# Patient Record
Sex: MALE | Race: WHITE | Employment: OTHER | URBAN - METROPOLITAN AREA
[De-identification: names, ages, dates, MRNs, and addresses within clinical notes are randomized per-mention and may not be internally consistent; named-entity substitution may affect disease eponyms.]

---

## 2018-07-19 ENCOUNTER — HOSPITAL ENCOUNTER (OUTPATIENT)
Age: 78
Discharge: HOME | End: 2018-07-19
Attending: EMERGENCY MEDICINE
Payer: MEDICARE

## 2018-07-19 VITALS
RESPIRATION RATE: 18 BRPM | SYSTOLIC BLOOD PRESSURE: 154 MMHG | OXYGEN SATURATION: 98 % | DIASTOLIC BLOOD PRESSURE: 93 MMHG | TEMPERATURE: 98 F | HEART RATE: 69 BPM

## 2018-07-19 DIAGNOSIS — J01.90 ACUTE NON-RECURRENT SINUSITIS, UNSPECIFIED LOCATION: Primary | ICD-10-CM

## 2018-07-19 PROCEDURE — 99204 OFFICE O/P NEW MOD 45 MIN: CPT

## 2018-07-19 PROCEDURE — 99203 OFFICE O/P NEW LOW 30 MIN: CPT

## 2018-07-19 RX ORDER — AZITHROMYCIN 250 MG/1
TABLET, FILM COATED ORAL
Qty: 1 PACKAGE | Refills: 0 | Status: SHIPPED | OUTPATIENT
Start: 2018-07-19 | End: 2019-02-06

## 2018-07-19 RX ORDER — FEXOFENADINE HYDROCHLORIDE 60 MG/1
60 TABLET, FILM COATED ORAL 2 TIMES DAILY
Qty: 20 TABLET | Refills: 0 | Status: SHIPPED | OUTPATIENT
Start: 2018-07-19 | End: 2018-08-08

## 2018-07-19 RX ORDER — FLUTICASONE PROPIONATE 50 MCG
2 SPRAY, SUSPENSION (ML) NASAL DAILY
Qty: 16 G | Refills: 0 | Status: SHIPPED | OUTPATIENT
Start: 2018-07-19 | End: 2018-08-18

## 2018-07-19 RX ORDER — METOPROLOL SUCCINATE 100 MG/1
100 TABLET, EXTENDED RELEASE ORAL DAILY
COMMUNITY

## 2018-07-19 NOTE — ED INITIAL ASSESSMENT (HPI)
Pt reports head and sinus congestion for 5-6 days. Reports mild, dry cough. Denies fevers. Denies pain. Reports nasal congestion.

## 2018-07-19 NOTE — ED PROVIDER NOTES
Patient Seen in: [address] [facility] [address]    History   Patient presents with:  Cough/URI    Stated Complaint: head cold    HPI    5year-old male patient presents complaining of cough and congestion with facial pressure and feeling ho plan with patient and patient's wife. Went over prescription medications.       Disposition and Plan     Clinical Impression:  Acute non-recurrent sinusitis, unspecified location  (primary encounter diagnosis)    Disposition:  Discharge  7/19/2018 10:13 am

## 2018-10-26 ENCOUNTER — HOSPITAL ENCOUNTER (OUTPATIENT)
Age: 78
Discharge: HOME | End: 2018-10-26
Attending: FAMILY MEDICINE
Payer: MEDICARE

## 2018-10-26 VITALS
TEMPERATURE: 98 F | RESPIRATION RATE: 18 BRPM | DIASTOLIC BLOOD PRESSURE: 89 MMHG | HEART RATE: 76 BPM | SYSTOLIC BLOOD PRESSURE: 147 MMHG | OXYGEN SATURATION: 99 %

## 2018-10-26 DIAGNOSIS — J06.9 VIRAL UPPER RESPIRATORY TRACT INFECTION: ICD-10-CM

## 2018-10-26 DIAGNOSIS — H60.331 ACUTE SWIMMER'S EAR OF RIGHT SIDE: Primary | ICD-10-CM

## 2018-10-26 PROCEDURE — 99214 OFFICE O/P EST MOD 30 MIN: CPT

## 2018-10-26 PROCEDURE — 99213 OFFICE O/P EST LOW 20 MIN: CPT

## 2018-10-26 RX ORDER — AMOXICILLIN 875 MG/1
875 TABLET, COATED ORAL 2 TIMES DAILY
Qty: 20 TABLET | Refills: 0 | Status: SHIPPED | OUTPATIENT
Start: 2018-10-26 | End: 2018-11-05

## 2018-10-26 NOTE — ED NOTES
Pt discharged home in stable condition. Meds and avs reviewed. Follow up as indicated. Pt verbalized understanding and agreed.

## 2018-10-26 NOTE — ED PROVIDER NOTES
Pt seen in [address] [place] [address]      Stated Complaint: Patient presents with:  Cough/URI      --------------   RN assessment:     Cold sx's for a few weeks, ears, R>L plugged  --------------    CC: cough, congestion, dim hearing stridor  Cardiovascular: negative for claudication and palpitations  Gastrointestinal: negative for  jaundice and odynophagia  Genitourinary:negative for decreased stream and nocturia  Behavioral/Psych: negative for aggressive behavior and hallucinations throughout     ------------------------------------------------  ED Course:  Labs/Tests reviewed:   Labs Reviewed - No data to display          ------------------------------------------------  MDM:  Differential diagnosis(es) d/w: BLANCA PERRIN; karlie, ?  Sinusitis

## 2018-10-26 NOTE — ED INITIAL ASSESSMENT (HPI)
Pt here with complaints of head congestion that has been going on for a few weeks and states a couple days ago his ears felt  plugged more on the right ear, pt denies any fevers at this time

## 2019-02-06 ENCOUNTER — HOSPITAL ENCOUNTER (OUTPATIENT)
Age: 79
Discharge: HOME | End: 2019-02-06
Attending: FAMILY MEDICINE
Payer: MEDICARE

## 2019-02-06 VITALS
TEMPERATURE: 97 F | WEIGHT: 210 LBS | HEIGHT: 71 IN | BODY MASS INDEX: 29.4 KG/M2 | RESPIRATION RATE: 22 BRPM | HEART RATE: 69 BPM | OXYGEN SATURATION: 98 % | SYSTOLIC BLOOD PRESSURE: 140 MMHG | DIASTOLIC BLOOD PRESSURE: 101 MMHG

## 2019-02-06 DIAGNOSIS — H61.22 IMPACTED CERUMEN OF LEFT EAR: Primary | ICD-10-CM

## 2019-02-06 DIAGNOSIS — J06.9 VIRAL UPPER RESPIRATORY TRACT INFECTION: ICD-10-CM

## 2019-02-06 DIAGNOSIS — H60.502 ACUTE OTITIS EXTERNA OF LEFT EAR, UNSPECIFIED TYPE: ICD-10-CM

## 2019-02-06 PROCEDURE — 69200 CLEAR OUTER EAR CANAL: CPT

## 2019-02-06 PROCEDURE — 99214 OFFICE O/P EST MOD 30 MIN: CPT

## 2019-02-06 PROCEDURE — 99213 OFFICE O/P EST LOW 20 MIN: CPT

## 2019-02-06 PROCEDURE — 69209 REMOVE IMPACTED EAR WAX UNI: CPT

## 2019-02-06 NOTE — ED NOTES
Pt discharged home stable in good condition. Denied dizziness. Reviewed electronically sent meds, pain and avs. Follow up as indicated. Pt verbalized understanding and agreed.

## 2019-02-06 NOTE — ED INITIAL ASSESSMENT (HPI)
Pt in [address] [place] [address] with wife c/o diminished hearing in left ear noted yesterday. He report having a head cold, he reports nasal congestion. Denied pain in ear.

## 2019-02-06 NOTE — ED PROVIDER NOTES
Patient Seen in: [address] [facility]    History   Patient presents with:  Ear Problem Pain (neurosensory)    Stated Complaint: lt ear pain    HPI    65-year-old male with past medical history of hypertension presents with 3 days sounds normal.   Abdominal: Soft. Bowel sounds are normal.   Musculoskeletal: Normal range of motion. Neurological: He is alert. Skin: Skin is warm. Capillary refill takes less than 2 seconds. Psychiatric: He has a normal mood and affect.  His behavio

## 2019-02-08 ENCOUNTER — OFFICE VISIT (OUTPATIENT)
Dept: OTOLARYNGOLOGY | Facility: CLINIC | Age: 79
End: 2019-02-08
Payer: MEDICARE

## 2019-02-08 VITALS
DIASTOLIC BLOOD PRESSURE: 80 MMHG | BODY MASS INDEX: 29.4 KG/M2 | TEMPERATURE: 97 F | SYSTOLIC BLOOD PRESSURE: 160 MMHG | HEIGHT: 71 IN | WEIGHT: 210 LBS

## 2019-02-08 DIAGNOSIS — H61.23 BILATERAL IMPACTED CERUMEN: Primary | ICD-10-CM

## 2019-02-08 PROCEDURE — 92504 EAR MICROSCOPY EXAMINATION: CPT | Performed by: OTOLARYNGOLOGY

## 2019-02-08 PROCEDURE — 99203 OFFICE O/P NEW LOW 30 MIN: CPT | Performed by: OTOLARYNGOLOGY

## 2019-02-08 PROCEDURE — G0463 HOSPITAL OUTPT CLINIC VISIT: HCPCS | Performed by: OTOLARYNGOLOGY

## 2019-02-08 NOTE — PROGRESS NOTES
Shree Carson is a 77year old male. Patient presents with:  Ear Wax: both ears    HPI:   He woke up a few days ago suddenly unable to hear from his left ear. There was no pain.   He went to the immediate care and was noted to have a large amount of wa Normal Inspection - Normal. Palpation - Normal. Parotid gland - Normal. Thyroid gland - Normal.   Psychiatric Normal Orientation - Oriented to time, place, person & situation. Appropriate mood and affect. Lymph Detail Normal Submental. Submandibular.  Ant

## 2019-09-17 ENCOUNTER — OFFICE VISIT (OUTPATIENT)
Dept: OTOLARYNGOLOGY | Facility: CLINIC | Age: 79
End: 2019-09-17
Payer: MEDICARE

## 2019-09-17 VITALS
DIASTOLIC BLOOD PRESSURE: 100 MMHG | BODY MASS INDEX: 28 KG/M2 | SYSTOLIC BLOOD PRESSURE: 158 MMHG | WEIGHT: 200 LBS | HEIGHT: 71 IN | TEMPERATURE: 98 F

## 2019-09-17 DIAGNOSIS — H60.391 OTHER INFECTIVE ACUTE OTITIS EXTERNA OF RIGHT EAR: Primary | ICD-10-CM

## 2019-09-17 PROCEDURE — 99213 OFFICE O/P EST LOW 20 MIN: CPT | Performed by: OTOLARYNGOLOGY

## 2019-09-17 PROCEDURE — G0463 HOSPITAL OUTPT CLINIC VISIT: HCPCS | Performed by: OTOLARYNGOLOGY

## 2019-09-17 NOTE — PROGRESS NOTES
Shree Carson is a 77year old male. Patient presents with:  Ear Problem: c/o bloody discharges at his right ear     HPI:   He noticed some blood in his right ear canal when he was trying to clean with a Q-tip. There is no pain.   There is no pus or dr cervical. Supraclavicular.    Eyes Normal Conjunctiva - Right: Normal, Left: Normal. Pupil - Right: Normal, Left: Normal.    Ears Normal Inspection - Right: Normal, Left: Normal. Canal - Left: Normal. TM - Right: Normal, Left: Normal.   Granulation polyp fr

## 2019-09-18 ENCOUNTER — TELEPHONE (OUTPATIENT)
Dept: OTOLARYNGOLOGY | Facility: CLINIC | Age: 79
End: 2019-09-18

## 2019-09-19 RX ORDER — OFLOXACIN 3 MG/ML
4 SOLUTION AURICULAR (OTIC) 2 TIMES DAILY
Qty: 1 BOTTLE | Refills: 0 | Status: SHIPPED | OUTPATIENT
Start: 2019-09-19 | End: 2019-09-29

## 2021-07-13 ENCOUNTER — HOSPITAL ENCOUNTER (OUTPATIENT)
Age: 81
Discharge: HOME | End: 2021-07-13
Payer: MEDICARE

## 2021-07-13 VITALS
OXYGEN SATURATION: 95 % | SYSTOLIC BLOOD PRESSURE: 151 MMHG | TEMPERATURE: 97 F | DIASTOLIC BLOOD PRESSURE: 85 MMHG | HEART RATE: 77 BPM | RESPIRATION RATE: 18 BRPM

## 2021-07-13 DIAGNOSIS — B34.9 VIRAL ILLNESS: Primary | ICD-10-CM

## 2021-07-13 DIAGNOSIS — Z20.822 ENCOUNTER FOR LABORATORY TESTING FOR COVID-19 VIRUS: ICD-10-CM

## 2021-07-13 LAB — SARS-COV-2 RNA RESP QL NAA+PROBE: NOT DETECTED

## 2021-07-13 PROCEDURE — 99203 OFFICE O/P NEW LOW 30 MIN: CPT | Performed by: NURSE PRACTITIONER

## 2021-07-13 PROCEDURE — U0002 COVID-19 LAB TEST NON-CDC: HCPCS | Performed by: NURSE PRACTITIONER

## 2021-07-13 RX ORDER — TAMSULOSIN HYDROCHLORIDE 0.4 MG/1
0.4 CAPSULE ORAL DAILY
COMMUNITY

## 2021-07-13 NOTE — ED PROVIDER NOTES
Patient Seen in: [facility]      History   Patient presents with:  Testing    Stated Complaint: TESTING    HPI/Subjective:   Well-appearing 24-year-old male presents for COVID-19 testing.   Patient communicates that since yesterday he has fel entry bilaterally without wheezes, rhonchi or crackles. No accessory muscle use or tachypnea. Abdomen: Soft, nontender, non-distended. Back: Normal inspection, no tenderness. Extremities: No focal swelling or tenderness. Capillary refill noted.

## 2021-10-27 ENCOUNTER — HOSPITAL ENCOUNTER (OUTPATIENT)
Age: 81
Discharge: HOME | End: 2021-10-27
Payer: MEDICARE

## 2021-10-27 VITALS
HEART RATE: 77 BPM | DIASTOLIC BLOOD PRESSURE: 80 MMHG | RESPIRATION RATE: 18 BRPM | OXYGEN SATURATION: 98 % | SYSTOLIC BLOOD PRESSURE: 144 MMHG | TEMPERATURE: 98 F

## 2021-10-27 DIAGNOSIS — H60.502 ACUTE OTITIS EXTERNA OF LEFT EAR, UNSPECIFIED TYPE: Primary | ICD-10-CM

## 2021-10-27 PROCEDURE — 99213 OFFICE O/P EST LOW 20 MIN: CPT | Performed by: NURSE PRACTITIONER

## 2021-10-27 RX ORDER — CIPROFLOXACIN AND DEXAMETHASONE 3; 1 MG/ML; MG/ML
4 SUSPENSION/ DROPS AURICULAR (OTIC) 2 TIMES DAILY
Qty: 7.5 ML | Refills: 0 | Status: SHIPPED | OUTPATIENT
Start: 2021-10-27 | End: 2021-11-03

## 2021-10-27 NOTE — ED INITIAL ASSESSMENT (HPI)
Pt came in due to left earache with \"clicking\" noise for the past 2 days. Pt denies any injury or trauma to the area. Pt denies any sob, cp, nvd, ha, fever, chills, body aches, or any dizziness. Pt has easy non labored respirations.  Pt is fully verbal an

## 2021-10-27 NOTE — ED PROVIDER NOTES
Patient Seen in: [facility]      History   Patient presents with:  Ear Problem Pain    Stated Complaint: EAR INFECTION     Subjective:    Well appearing 79-year-old male presents with complaints of a clicking sound and intermittent pain to hi ROM.    Lungs: Good inspiratory effort. No accessory muscle use or tachypnea. Abdomen: Soft, nontender, non-distended. Extremities: No focal swelling or tenderness. Capillary refill noted. Skin: Warm, dry and normal in color.      Psychiatric: Norm

## 2022-11-28 ENCOUNTER — APPOINTMENT (OUTPATIENT)
Dept: URBAN - METROPOLITAN AREA CLINIC 244 | Age: 82
Setting detail: DERMATOLOGY
End: 2022-11-29

## 2022-11-28 DIAGNOSIS — L82.1 OTHER SEBORRHEIC KERATOSIS: ICD-10-CM

## 2022-11-28 DIAGNOSIS — L81.4 OTHER MELANIN HYPERPIGMENTATION: ICD-10-CM

## 2022-11-28 DIAGNOSIS — D22 MELANOCYTIC NEVI: ICD-10-CM

## 2022-11-28 PROBLEM — C44.311 BASAL CELL CARCINOMA OF SKIN OF NOSE: Status: ACTIVE | Noted: 2022-11-28

## 2022-11-28 PROBLEM — D22.39 MELANOCYTIC NEVI OF OTHER PARTS OF FACE: Status: ACTIVE | Noted: 2022-11-28

## 2022-11-28 PROCEDURE — OTHER ADDITIONAL NOTES: OTHER

## 2022-11-28 PROCEDURE — 99203 OFFICE O/P NEW LOW 30 MIN: CPT

## 2022-11-28 PROCEDURE — OTHER COUNSELING: OTHER

## 2022-11-28 ASSESSMENT — LOCATION ZONE DERM
LOCATION ZONE: FACE
LOCATION ZONE: HAND

## 2022-11-28 ASSESSMENT — LOCATION SIMPLE DESCRIPTION DERM
LOCATION SIMPLE: RIGHT FOREHEAD
LOCATION SIMPLE: LEFT HAND
LOCATION SIMPLE: LEFT CHEEK
LOCATION SIMPLE: RIGHT HAND
LOCATION SIMPLE: RIGHT CHEEK

## 2022-11-28 ASSESSMENT — LOCATION DETAILED DESCRIPTION DERM
LOCATION DETAILED: RIGHT INFERIOR MEDIAL MALAR CHEEK
LOCATION DETAILED: RIGHT INFERIOR MEDIAL FOREHEAD
LOCATION DETAILED: LEFT ULNAR DORSAL HAND
LOCATION DETAILED: LEFT CENTRAL MALAR CHEEK
LOCATION DETAILED: LEFT RADIAL DORSAL HAND
LOCATION DETAILED: RIGHT RADIAL DORSAL HAND

## 2022-11-28 NOTE — PROCEDURE: ADDITIONAL NOTES
Additional Notes: Presumptive BCC - bx done by Dr. Jose derm.  Pt wanted to discuss options but we did not have the path report so not sure if margins clear or if superficial, etc.  Plan was to call patient after I review the pathology report, but after I left he decided just to go back to Dr. Jose
Detail Level: Detailed
Render Risk Assessment In Note?: no

## 2023-02-14 ENCOUNTER — APPOINTMENT (OUTPATIENT)
Dept: URBAN - METROPOLITAN AREA CLINIC 244 | Age: 83
Setting detail: DERMATOLOGY
End: 2023-02-16

## 2023-02-14 DIAGNOSIS — L57.0 ACTINIC KERATOSIS: ICD-10-CM

## 2023-02-14 PROBLEM — C44.311 BASAL CELL CARCINOMA OF SKIN OF NOSE: Status: ACTIVE | Noted: 2023-02-14

## 2023-02-14 PROCEDURE — OTHER COUNSELING: OTHER

## 2023-02-14 PROCEDURE — 99213 OFFICE O/P EST LOW 20 MIN: CPT

## 2023-02-14 PROCEDURE — OTHER ADDITIONAL NOTES: OTHER

## 2023-02-14 ASSESSMENT — LOCATION DETAILED DESCRIPTION DERM
LOCATION DETAILED: LEFT CENTRAL MALAR CHEEK
LOCATION DETAILED: RIGHT CENTRAL MALAR CHEEK

## 2023-02-14 ASSESSMENT — LOCATION SIMPLE DESCRIPTION DERM
LOCATION SIMPLE: LEFT CHEEK
LOCATION SIMPLE: RIGHT CHEEK

## 2023-02-14 ASSESSMENT — LOCATION ZONE DERM: LOCATION ZONE: FACE

## 2023-02-14 NOTE — PROCEDURE: ADDITIONAL NOTES
Detail Level: Simple
Additional Notes: Patient has another derm that treats AK’s
Additional Notes: Patient brought in Pathology report from JAJA Jose, it is scanned to patients attachments. Discussed SRT vs Mohs excision. Patient would like to further discuss SRT, patient was concerned Mohs may require too many stages. Patient wanted to hear a different opinion regarding treatment. Photo was taken today.
Render Risk Assessment In Note?: no

## 2023-02-14 NOTE — HPI: SKIN LESION (BASAL CELL CARCINOMA)
How Severe Is Your Skin Cancer?: moderate
Is This A New Presentation, Or A Follow-Up?: Basal Cell Carcinoma
When Was Basal Cell Biopsied? (Optional): 8/13/2022
Accession # (Optional): [id number]

## 2023-02-28 ENCOUNTER — APPOINTMENT (OUTPATIENT)
Dept: URBAN - METROPOLITAN AREA CLINIC 321 | Age: 83
Setting detail: DERMATOLOGY
End: 2023-03-01

## 2023-02-28 PROBLEM — C44.311 BASAL CELL CARCINOMA OF SKIN OF NOSE: Status: ACTIVE | Noted: 2023-02-28

## 2023-02-28 PROCEDURE — 99213 OFFICE O/P EST LOW 20 MIN: CPT | Mod: 25

## 2023-02-28 PROCEDURE — OTHER SUPERFICIAL RADIATION TREATMENT: OTHER

## 2023-02-28 PROCEDURE — OTHER TREATMENT REGIMEN: OTHER

## 2023-02-28 PROCEDURE — 77300 RADIATION THERAPY DOSE PLAN: CPT

## 2023-02-28 PROCEDURE — G6001 ECHO GUIDANCE RADIOTHERAPY: HCPCS

## 2023-02-28 PROCEDURE — 77332 RADIATION TREATMENT AID(S): CPT

## 2023-02-28 PROCEDURE — OTHER FOLLOW UP FOR NEXT VISIT: OTHER

## 2023-02-28 PROCEDURE — 77261 THER RADIOLOGY TX PLNG SMPL: CPT

## 2023-02-28 PROCEDURE — 77280 THER RAD SIMULAJ FIELD SMPL: CPT

## 2023-02-28 NOTE — HPI: BIOPSY PROVEN BCC
When Was The Bcc Biopsied? (Optional): 8/13/22
Accession # (Optional): [id number]
Additional History: Prev biopsied by Vaishnavi Jose At [facility]
Continue to Observe

## 2023-02-28 NOTE — PROCEDURE: SUPERFICIAL RADIATION TREATMENT
Bill For Simulation?: Yes - (Simple Simulation: 27778) Bill For Simulation: Yes - (Simple Simulation: 22795)

## 2023-02-28 NOTE — PROCEDURE: TREATMENT REGIMEN
Detail Level: Zone
Plan: Per the request of Dr. Faith, the patient was seen today for Superficial Radiation Therapy planning requiring initial simulation in preparation for treatment of specific diseased sites. Simulation is necessary to determine the correct patient and treatment portal positioning, to deliver safe and effective radiation therapy. A high-frequency ultrasound image was acquired prior to treatment today for two- dimensional evaluation of tumor volume and will be repeated per fraction for response to treatment, in addition, to geometric accuracy of field placement. Physician evaluation of the ultrasound tumor depth, width, and breadth will be ongoing through the course of treatment and is deemed medically necessary ensuring efficacy of treatment and determine whether to proceed with delivery of therapy. Today’s image and setup were evaluated to determine the initiation of treatment. All appropriate blocking and treatment parameters were verified by the radiation therapist and provider.\\n\\nUltrasound depth is  1.73mm.\\n\\nPer Dr. Faith, continued per fraction ultrasound guidance and simulation are required for field placement, measurement of tumor depth, width and breadth, progress, and edema monitoring.\\n\\nPer the request of Dr. Faith, continuing medical physics review as per radiotherapy standard of care post every 5th fraction for the patient, including assessment of treatment parameters,  of dose delivery, and review of patient treatment documentation in support of the provider, is ordered, ensuring efficacy and continued safe delivery of radiotherapy. Included in the physics check is a review of patient setup information, all pertinent simulation and treatment photograph(s) checks, prescription, dose calculation verification, per fraction dose charted correctly, elapsed days and treatment days correctly charted, cumulative dose correct, and review of any prescription changes. Continued medical physics review post every 5th fraction of radiotherapy is requested by the provider for appropriate radiotherapy management and is deemed medically necessary and standard of care.\\n

## 2023-02-28 NOTE — PROCEDURE: SUPERFICIAL RADIATION TREATMENT
Computed Treatment Time In Min (Will Render The Same As Calculated Treatment Time If Left Blank): 0.46

## 2023-02-28 NOTE — PROCEDURE: SUPERFICIAL RADIATION TREATMENT
Pathology Override (Pathology Will Render As Diagnosis Name If Left Blank): Infiltrating Basal Cell Carcinoma

## 2023-03-07 ENCOUNTER — APPOINTMENT (OUTPATIENT)
Dept: URBAN - METROPOLITAN AREA CLINIC 321 | Age: 83
Setting detail: DERMATOLOGY
End: 2023-03-07

## 2023-03-07 PROBLEM — C44.311 BASAL CELL CARCINOMA OF SKIN OF NOSE: Status: ACTIVE | Noted: 2023-03-07

## 2023-03-07 PROCEDURE — 77280 THER RAD SIMULAJ FIELD SMPL: CPT

## 2023-03-07 PROCEDURE — OTHER SUPERFICIAL RADIATION TREATMENT: OTHER

## 2023-03-07 PROCEDURE — G6001 ECHO GUIDANCE RADIOTHERAPY: HCPCS | Mod: XU

## 2023-03-07 PROCEDURE — 77401 RADIATION TX DELIVERY SUPFC: CPT

## 2023-03-07 PROCEDURE — OTHER FOLLOW UP FOR NEXT VISIT: OTHER

## 2023-03-07 PROCEDURE — OTHER TREATMENT REGIMEN: OTHER

## 2023-03-07 NOTE — PROCEDURE: SUPERFICIAL RADIATION TREATMENT
Bill For Simulation?: Yes - (Simple Simulation: 37951) Bill For Simulation: Yes - (Simple Simulation: 09782)

## 2023-03-08 ENCOUNTER — APPOINTMENT (OUTPATIENT)
Dept: URBAN - METROPOLITAN AREA CLINIC 321 | Age: 83
Setting detail: DERMATOLOGY
End: 2023-03-08

## 2023-03-08 PROBLEM — C44.311 BASAL CELL CARCINOMA OF SKIN OF NOSE: Status: ACTIVE | Noted: 2023-03-08

## 2023-03-08 PROCEDURE — OTHER FOLLOW UP FOR NEXT VISIT: OTHER

## 2023-03-08 PROCEDURE — G6001 ECHO GUIDANCE RADIOTHERAPY: HCPCS | Mod: XU

## 2023-03-08 PROCEDURE — 77401 RADIATION TX DELIVERY SUPFC: CPT

## 2023-03-08 PROCEDURE — 77280 THER RAD SIMULAJ FIELD SMPL: CPT

## 2023-03-08 PROCEDURE — OTHER SUPERFICIAL RADIATION TREATMENT: OTHER

## 2023-03-08 PROCEDURE — OTHER TREATMENT REGIMEN: OTHER

## 2023-03-08 NOTE — PROCEDURE: SUPERFICIAL RADIATION TREATMENT
What Type Of Note Output Would You Prefer (Optional)?: Standard Output Is The Patient Presenting As Previously Scheduled?: Yes How Severe Is Your Rash?: moderate Is This A New Presentation, Or A Follow-Up?: Rash Total Dose (Optional-Please Include Units): [id number]

## 2023-03-08 NOTE — PROCEDURE: TREATMENT REGIMEN

## 2023-03-08 NOTE — PROCEDURE: SUPERFICIAL RADIATION TREATMENT
Day Of The Week Treatment Administered: Wednesday Quality 111:Pneumonia Vaccination Status For Older Adults: Pneumococcal Vaccination Previously Received Detail Level: Zone Quality 110: Preventive Care And Screening: Influenza Immunization: Influenza Immunization previously received during influenza season

## 2023-03-08 NOTE — PROCEDURE: SUPERFICIAL RADIATION TREATMENT
Bill For Simulation?: Yes - (Simple Simulation: 09980) Bill For Simulation: Yes - (Simple Simulation: 31596)

## 2023-03-09 ENCOUNTER — APPOINTMENT (OUTPATIENT)
Dept: URBAN - METROPOLITAN AREA CLINIC 321 | Age: 83
Setting detail: DERMATOLOGY
End: 2023-03-09

## 2023-03-09 PROBLEM — C44.311 BASAL CELL CARCINOMA OF SKIN OF NOSE: Status: ACTIVE | Noted: 2023-03-09

## 2023-03-09 PROCEDURE — 77280 THER RAD SIMULAJ FIELD SMPL: CPT

## 2023-03-09 PROCEDURE — OTHER TREATMENT REGIMEN: OTHER

## 2023-03-09 PROCEDURE — G6001 ECHO GUIDANCE RADIOTHERAPY: HCPCS | Mod: XU

## 2023-03-09 PROCEDURE — 77401 RADIATION TX DELIVERY SUPFC: CPT

## 2023-03-09 PROCEDURE — OTHER FOLLOW UP FOR NEXT VISIT: OTHER

## 2023-03-09 PROCEDURE — OTHER SUPERFICIAL RADIATION TREATMENT: OTHER

## 2023-03-09 NOTE — PROCEDURE: TREATMENT REGIMEN

## 2023-03-09 NOTE — PROCEDURE: SUPERFICIAL RADIATION TREATMENT
Bill For Simulation?: Yes - (Simple Simulation: 08885) Bill For Simulation: Yes - (Simple Simulation: 23854)

## 2023-03-14 ENCOUNTER — APPOINTMENT (OUTPATIENT)
Dept: URBAN - METROPOLITAN AREA CLINIC 321 | Age: 83
Setting detail: DERMATOLOGY
End: 2023-03-15

## 2023-03-14 PROBLEM — C44.311 BASAL CELL CARCINOMA OF SKIN OF NOSE: Status: ACTIVE | Noted: 2023-03-14

## 2023-03-14 PROCEDURE — OTHER SUPERFICIAL RADIATION TREATMENT: OTHER

## 2023-03-14 PROCEDURE — OTHER TREATMENT REGIMEN: OTHER

## 2023-03-14 PROCEDURE — OTHER FOLLOW UP FOR NEXT VISIT: OTHER

## 2023-03-14 PROCEDURE — 77280 THER RAD SIMULAJ FIELD SMPL: CPT

## 2023-03-14 PROCEDURE — G6001 ECHO GUIDANCE RADIOTHERAPY: HCPCS | Mod: XU

## 2023-03-14 PROCEDURE — 77401 RADIATION TX DELIVERY SUPFC: CPT

## 2023-03-14 NOTE — PROCEDURE: SUPERFICIAL RADIATION TREATMENT
Bill For Simulation?: Yes - (Simple Simulation: 09123) Bill For Simulation: Yes - (Simple Simulation: 33808)

## 2023-03-14 NOTE — PROCEDURE: TREATMENT REGIMEN

## 2023-03-15 ENCOUNTER — APPOINTMENT (OUTPATIENT)
Dept: URBAN - METROPOLITAN AREA CLINIC 321 | Age: 83
Setting detail: DERMATOLOGY
End: 2023-03-17

## 2023-03-15 PROBLEM — C44.311 BASAL CELL CARCINOMA OF SKIN OF NOSE: Status: ACTIVE | Noted: 2023-03-15

## 2023-03-15 PROCEDURE — G6001 ECHO GUIDANCE RADIOTHERAPY: HCPCS | Mod: XU

## 2023-03-15 PROCEDURE — 77401 RADIATION TX DELIVERY SUPFC: CPT

## 2023-03-15 PROCEDURE — OTHER FOLLOW UP FOR NEXT VISIT: OTHER

## 2023-03-15 PROCEDURE — 77427 RADIATION TX MANAGEMENT X5: CPT

## 2023-03-15 PROCEDURE — OTHER SUPERFICIAL RADIATION TREATMENT: OTHER

## 2023-03-15 PROCEDURE — OTHER TREATMENT REGIMEN: OTHER

## 2023-03-15 PROCEDURE — 77280 THER RAD SIMULAJ FIELD SMPL: CPT

## 2023-03-15 NOTE — PROCEDURE: TREATMENT REGIMEN
Plan: This patient has been treated today with image-guided superficial radiation therapy for non-melanoma skin cancer. Written informed consent has been previously obtained from this patient for this treatment. This consent is documented in the patient's chart. The patient gave verbal consent to continue treatment today. The patient was treated with a specific radiation dose and setup as prescribed by the provider listed on this visit note. A Radiation Therapist performed administration of radiation under the supervision of a provider. The treatment parameters and cumulative dose are indicated above. Prior to administering the radiation, the patient underwent a verification therapeutic radiology simulation-aided field setting defining relevant normal and abnormal target anatomy and acquiring images with a separate and distinct diagnostic high-frequency ultrasound to delineate tissues and determine whether to proceed with delivery of therapy, in addition to retrieve data necessary to develop an optimal radiation treatment process for the patient. The field placement simulation documents any change seen in overall tumor volume documented in the patient’s record, allows the clinician to indicate any needed changes in the treatment plan and/or prescription, provides diagnostic evaluation as the basis for performing the therapeutic procedure, and clearly identifies the information needed to decide to proceed with the therapeutic procedure. This process includes verification of the treatment port(s) and proper treatment positioning. All treatment ports were photographed within electronic medical records. The patient's lead blocking along with gross tumor volume and margin was confirmed. Considering superficial radiotherapy is clinical in setup, this requires the physician and radiation therapist to clarify the location interest being treated against initial images, ultrasound, pathology, and patient anatomy. Care was taken to ensure adams treated were geometrically accurate and properly positioned using therapeutic radiology simulation-aided field setting verification per fraction. This process is also utilized to determine if any prescription or setup changes are necessary. These steps are therefore medically necessary to ensure safe and effective administration of radiation. Ongoing therapeutic radiology simulation-aided field setting verification is ordered throughout the course of therapy.\\n\\nA high-frequency ultrasound image was acquired today for a two-dimensional evaluation of the tumor volume, depth, width, breadth, review of prior response to treatment, provide geometric accuracy of field placement, and determine whether to proceed with therapeutic delivery.\\n\\nHigh frequency ultrasound depth is  1.40mm, which is  0.09mm in difference from previous imaging.\\n\\nThe field placement and ultrasound imaging, per fraction, is separate and distinct from the initial simulation and is an important task in providing safe administration of superficial radiation therapy. Physician evaluation of the ultrasound information will be ongoing throughout the course of treatment and is deemed medically necessary to ensure the efficacy of treatment, whether to proceed with therapeutic delivery, and determine any necessary changes. Today's images were evaluated for determination of continuation of treatment with the current plan or with necessary changes as appropriate. According to the review of verification therapeutic radiology simulation-aided field setting and imaging, no change is required. Additionally, the use of ultrasound visualization and targeted assessment allows the patient to be able to see his or her cancer(s) progress, encouraging the patient to complete and maintain compliance through the full course of prescribed radiotherapy. Per Dr. Faith, continued ultrasound guidance and therapeutic radiology simulation-aided field setting verification per fraction is required for field placement, measurement of tumor depth, tissues evaluation, progress, acute effect monitoring, and determination for therapeutic treatment delivery is appropriate.\\n\\nPlan: The patient is undergoing superficial radiation therapy for skin cancer and presents for weekly evaluation and management. Per protocol and as documented on the flow sheet, the patient was questioned as to subjective redness, pruritus, pain, drainage, fatigue, or any other symptoms. Objectively, the radiation area was evaluated with regards to erythema, atrophy, scale, crusting, erosion, ulceration, edema, purpura, tenderness, warmth, drainage, and any other findings. The plan was extensively reviewed including dose and dosing schedule. The simulation and clinical setup were also reviewed as were external and any internal shields and based on this review the appropriateness and sufficiency of treatment was determined.\\n\\nA high-frequency ultrasound image was acquired today for a two-dimensional evaluation of the tumor volume, depth, width, breadth, review of prior response to treatment, provide geometric accuracy of field placement, and determine whether to proceed with therapeutic delivery.\\n\\nHigh frequency ultrasound depth is  1.40mm, which is  0.09mm in difference from previous imaging.\\n\\nPer Dr. Faith, continued ultrasound guidance and therapeutic radiology simulation-aided field setting verification per fraction is required for field placement, measurement of tumor depth, tissues evaluation, progress, acute effect monitoring, and determination for therapeutic treatment delivery is appropriate.\\n\\nSubjective:\\nRedness(mild)\\n\\nObjective:\\nErythema(mild) \\n\\nAssessment:\\nAppropriate reaction\\n\\nPlan:\\nContinue current treatment regimen \\n\\nComments:\\nRTOG 1\\n\\n\\n\\n\\n\\n
Detail Level: Zone

## 2023-03-15 NOTE — PROCEDURE: SUPERFICIAL RADIATION TREATMENT
Bill For Simulation: Yes - (Simple Simulation: 63208) Bill For Simulation?: Yes - (Simple Simulation: 99626)

## 2023-03-16 ENCOUNTER — APPOINTMENT (OUTPATIENT)
Dept: URBAN - METROPOLITAN AREA CLINIC 321 | Age: 83
Setting detail: DERMATOLOGY
End: 2023-03-16

## 2023-03-16 PROBLEM — C44.311 BASAL CELL CARCINOMA OF SKIN OF NOSE: Status: ACTIVE | Noted: 2023-03-16

## 2023-03-16 PROCEDURE — 77280 THER RAD SIMULAJ FIELD SMPL: CPT

## 2023-03-16 PROCEDURE — G6001 ECHO GUIDANCE RADIOTHERAPY: HCPCS | Mod: XU

## 2023-03-16 PROCEDURE — OTHER FOLLOW UP FOR NEXT VISIT: OTHER

## 2023-03-16 PROCEDURE — 77401 RADIATION TX DELIVERY SUPFC: CPT

## 2023-03-16 PROCEDURE — OTHER TREATMENT REGIMEN: OTHER

## 2023-03-16 PROCEDURE — OTHER SUPERFICIAL RADIATION TREATMENT: OTHER

## 2023-03-16 NOTE — PROCEDURE: TREATMENT REGIMEN

## 2023-03-16 NOTE — PROCEDURE: SUPERFICIAL RADIATION TREATMENT
Bill For Simulation?: Yes - (Simple Simulation: 60948) Bill For Simulation: Yes - (Simple Simulation: 71736)

## 2023-03-21 ENCOUNTER — APPOINTMENT (OUTPATIENT)
Dept: URBAN - METROPOLITAN AREA CLINIC 321 | Age: 83
Setting detail: DERMATOLOGY
End: 2023-03-21

## 2023-03-21 PROBLEM — C44.311 BASAL CELL CARCINOMA OF SKIN OF NOSE: Status: ACTIVE | Noted: 2023-03-21

## 2023-03-21 PROCEDURE — OTHER FOLLOW UP FOR NEXT VISIT: OTHER

## 2023-03-21 PROCEDURE — OTHER SUPERFICIAL RADIATION TREATMENT: OTHER

## 2023-03-21 PROCEDURE — 77280 THER RAD SIMULAJ FIELD SMPL: CPT

## 2023-03-21 PROCEDURE — 77401 RADIATION TX DELIVERY SUPFC: CPT

## 2023-03-21 PROCEDURE — G6001 ECHO GUIDANCE RADIOTHERAPY: HCPCS | Mod: XU

## 2023-03-21 PROCEDURE — OTHER TREATMENT REGIMEN: OTHER

## 2023-03-21 NOTE — PROCEDURE: TREATMENT REGIMEN

## 2023-03-21 NOTE — PROCEDURE: SUPERFICIAL RADIATION TREATMENT
Bill For Simulation: Yes - (Simple Simulation: 69470) Bill For Simulation?: Yes - (Simple Simulation: 78855)

## 2023-03-22 ENCOUNTER — APPOINTMENT (OUTPATIENT)
Dept: URBAN - METROPOLITAN AREA CLINIC 321 | Age: 83
Setting detail: DERMATOLOGY
End: 2023-03-22

## 2023-03-22 PROBLEM — C44.311 BASAL CELL CARCINOMA OF SKIN OF NOSE: Status: ACTIVE | Noted: 2023-03-22

## 2023-03-22 PROCEDURE — OTHER TREATMENT REGIMEN: OTHER

## 2023-03-22 PROCEDURE — 77280 THER RAD SIMULAJ FIELD SMPL: CPT

## 2023-03-22 PROCEDURE — G6001 ECHO GUIDANCE RADIOTHERAPY: HCPCS | Mod: 59

## 2023-03-22 PROCEDURE — 77401 RADIATION TX DELIVERY SUPFC: CPT

## 2023-03-22 PROCEDURE — OTHER FOLLOW UP FOR NEXT VISIT: OTHER

## 2023-03-22 PROCEDURE — OTHER SUPERFICIAL RADIATION TREATMENT: OTHER

## 2023-03-22 NOTE — PROCEDURE: SUPERFICIAL RADIATION TREATMENT
Bill For Simulation: Yes - (Simple Simulation: 11733) Bill For Simulation?: Yes - (Simple Simulation: 06167)

## 2023-03-22 NOTE — PROCEDURE: TREATMENT REGIMEN

## 2023-03-23 ENCOUNTER — APPOINTMENT (OUTPATIENT)
Dept: URBAN - METROPOLITAN AREA CLINIC 321 | Age: 83
Setting detail: DERMATOLOGY
End: 2023-03-23

## 2023-03-23 PROBLEM — C44.311 BASAL CELL CARCINOMA OF SKIN OF NOSE: Status: ACTIVE | Noted: 2023-03-23

## 2023-03-23 PROCEDURE — OTHER FOLLOW UP FOR NEXT VISIT: OTHER

## 2023-03-23 PROCEDURE — 77280 THER RAD SIMULAJ FIELD SMPL: CPT

## 2023-03-23 PROCEDURE — 77401 RADIATION TX DELIVERY SUPFC: CPT

## 2023-03-23 PROCEDURE — OTHER TREATMENT REGIMEN: OTHER

## 2023-03-23 PROCEDURE — OTHER SUPERFICIAL RADIATION TREATMENT: OTHER

## 2023-03-23 PROCEDURE — G6001 ECHO GUIDANCE RADIOTHERAPY: HCPCS | Mod: XU

## 2023-03-23 NOTE — PROCEDURE: SUPERFICIAL RADIATION TREATMENT
Bill For Simulation?: Yes - (Simple Simulation: 98552) Bill For Simulation: Yes - (Simple Simulation: 36305)

## 2023-03-23 NOTE — PROCEDURE: TREATMENT REGIMEN

## 2023-03-27 ENCOUNTER — APPOINTMENT (OUTPATIENT)
Dept: URBAN - METROPOLITAN AREA CLINIC 244 | Age: 83
Setting detail: DERMATOLOGY
End: 2023-04-04

## 2023-03-27 PROBLEM — C44.311 BASAL CELL CARCINOMA OF SKIN OF NOSE: Status: ACTIVE | Noted: 2023-03-27

## 2023-03-27 PROCEDURE — OTHER SUPERFICIAL RADIATION TREATMENT: OTHER

## 2023-03-27 PROCEDURE — 77280 THER RAD SIMULAJ FIELD SMPL: CPT

## 2023-03-27 PROCEDURE — OTHER FOLLOW UP FOR NEXT VISIT: OTHER

## 2023-03-27 PROCEDURE — 77401 RADIATION TX DELIVERY SUPFC: CPT

## 2023-03-27 PROCEDURE — G6001 ECHO GUIDANCE RADIOTHERAPY: HCPCS | Mod: XU

## 2023-03-27 PROCEDURE — 77427 RADIATION TX MANAGEMENT X5: CPT

## 2023-03-27 PROCEDURE — OTHER TREATMENT REGIMEN: OTHER

## 2023-03-27 NOTE — PROCEDURE: TREATMENT REGIMEN
Detail Level: Zone
Plan: This patient has been treated today with image-guided superficial radiation therapy for non-melanoma skin cancer. Written informed consent has been previously obtained from this patient for this treatment. This consent is documented in the patient's chart. The patient gave verbal consent to continue treatment today. The patient was treated with a specific radiation dose and setup as prescribed by the provider listed on this visit note. A Radiation Therapist performed administration of radiation under the supervision of a provider. The treatment parameters and cumulative dose are indicated above. Prior to administering the radiation, the patient underwent a verification therapeutic radiology simulation-aided field setting defining relevant normal and abnormal target anatomy and acquiring images with a separate and distinct diagnostic high-frequency ultrasound to delineate tissues and determine whether to proceed with delivery of therapy, in addition to retrieve data necessary to develop an optimal radiation treatment process for the patient. The field placement simulation documents any change seen in overall tumor volume documented in the patient’s record, allows the clinician to indicate any needed changes in the treatment plan and/or prescription, provides diagnostic evaluation as the basis for performing the therapeutic procedure, and clearly identifies the information needed to decide to proceed with the therapeutic procedure. This process includes verification of the treatment port(s) and proper treatment positioning. All treatment ports were photographed within electronic medical records. The patient's lead blocking along with gross tumor volume and margin was confirmed. Considering superficial radiotherapy is clinical in setup, this requires the physician and radiation therapist to clarify the location interest being treated against initial images, ultrasound, pathology, and patient anatomy. Care was taken to ensure adams treated were geometrically accurate and properly positioned using therapeutic radiology simulation-aided field setting verification per fraction. This process is also utilized to determine if any prescription or setup changes are necessary. These steps are therefore medically necessary to ensure safe and effective administration of radiation. Ongoing therapeutic radiology simulation-aided field setting verification is ordered throughout the course of therapy.\\n\\nA high-frequency ultrasound image was acquired today for a two-dimensional evaluation of the tumor volume, depth, width, breadth, review of prior response to treatment, provide geometric accuracy of field placement, and determine whether to proceed with therapeutic delivery.\\n\\nHigh frequency ultrasound depth is  2.09mm, which is  0.42mm in difference from previous imaging.\\n\\nThe field placement and ultrasound imaging, per fraction, is separate and distinct from the initial simulation and is an important task in providing safe administration of superficial radiation therapy. Physician evaluation of the ultrasound information will be ongoing throughout the course of treatment and is deemed medically necessary to ensure the efficacy of treatment, whether to proceed with therapeutic delivery, and determine any necessary changes. Today's images were evaluated for determination of continuation of treatment with the current plan or with necessary changes as appropriate. According to the review of verification therapeutic radiology simulation-aided field setting and imaging, no change is required. Additionally, the use of ultrasound visualization and targeted assessment allows the patient to be able to see his or her cancer(s) progress, encouraging the patient to complete and maintain compliance through the full course of prescribed radiotherapy. Per Dr. Faith, continued ultrasound guidance and therapeutic radiology simulation-aided field setting verification per fraction is required for field placement, measurement of tumor depth, tissues evaluation, progress, acute effect monitoring, and determination for therapeutic treatment delivery is appropriate.\\n\\nThe patient is undergoing superficial radiation therapy for skin cancer and presents for weekly evaluation and management. Per protocol and as documented on the flow sheet, the patient was questioned as to subjective redness, pruritus, pain, drainage, fatigue, or any other symptoms. Objectively, the radiation area was evaluated with regards to erythema, atrophy, scale, crusting, erosion, ulceration, edema, purpura, tenderness, warmth, drainage, and any other findings. The plan was extensively reviewed including dose and dosing schedule. The simulation and clinical setup were also reviewed as were external and any internal shields and based on this review the appropriateness and sufficiency of treatment was determined. \\n\\nA high-frequency ultrasound image was acquired today for a two-dimensional evaluation of the tumor volume, depth, width, breadth, review of prior response to treatment, provide geometric accuracy of field placement, and determine whether to proceed with therapeutic delivery.  \\n \\nHigh frequency ultrasound depth is 2.09mm, which is 0.42mm in difference from previous imaging.  \\n\\nPer Dr. Faith continued ultrasound guidance and therapeutic radiology simulation-aided field setting verification per fraction is required for field placement, measurement of tumor depth, tissues evaluation, progress, acute effect monitoring, and determination for therapeutic treatment delivery is appropriate.\\nSubjective:\\nRedness \\n\\nObjective:\\nErythema, Crusting\\n\\nAssessment:\\nAppropriate reaction\\n\\nPlan:\\nContinue current treatment regimen \\n\\nComments:\\nRTOG 2

## 2023-03-27 NOTE — PROCEDURE: SUPERFICIAL RADIATION TREATMENT
Bill For Simulation: Yes - (Simple Simulation: 17817) Bill For Simulation?: Yes - (Simple Simulation: 09502)

## 2023-03-28 ENCOUNTER — APPOINTMENT (OUTPATIENT)
Dept: URBAN - METROPOLITAN AREA CLINIC 244 | Age: 83
Setting detail: DERMATOLOGY
End: 2023-04-04

## 2023-03-28 PROBLEM — C44.311 BASAL CELL CARCINOMA OF SKIN OF NOSE: Status: ACTIVE | Noted: 2023-03-28

## 2023-03-28 PROCEDURE — OTHER FOLLOW UP FOR NEXT VISIT: OTHER

## 2023-03-28 PROCEDURE — G6001 ECHO GUIDANCE RADIOTHERAPY: HCPCS | Mod: XU

## 2023-03-28 PROCEDURE — 77280 THER RAD SIMULAJ FIELD SMPL: CPT

## 2023-03-28 PROCEDURE — OTHER TREATMENT REGIMEN: OTHER

## 2023-03-28 PROCEDURE — 77401 RADIATION TX DELIVERY SUPFC: CPT

## 2023-03-28 PROCEDURE — OTHER SUPERFICIAL RADIATION TREATMENT: OTHER

## 2023-03-28 NOTE — PROCEDURE: SUPERFICIAL RADIATION TREATMENT
Bill For Simulation: Yes - (Simple Simulation: 33124) Bill For Simulation?: Yes - (Simple Simulation: 48153)

## 2023-03-28 NOTE — PROCEDURE: TREATMENT REGIMEN

## 2023-03-30 ENCOUNTER — APPOINTMENT (OUTPATIENT)
Dept: URBAN - METROPOLITAN AREA CLINIC 244 | Age: 83
Setting detail: DERMATOLOGY
End: 2023-04-04

## 2023-03-30 PROBLEM — C44.311 BASAL CELL CARCINOMA OF SKIN OF NOSE: Status: ACTIVE | Noted: 2023-03-30

## 2023-03-30 PROCEDURE — 77280 THER RAD SIMULAJ FIELD SMPL: CPT

## 2023-03-30 PROCEDURE — 77401 RADIATION TX DELIVERY SUPFC: CPT

## 2023-03-30 PROCEDURE — OTHER TREATMENT REGIMEN: OTHER

## 2023-03-30 PROCEDURE — OTHER FOLLOW UP FOR NEXT VISIT: OTHER

## 2023-03-30 PROCEDURE — OTHER SUPERFICIAL RADIATION TREATMENT: OTHER

## 2023-03-30 PROCEDURE — G6001 ECHO GUIDANCE RADIOTHERAPY: HCPCS | Mod: XU

## 2023-03-30 NOTE — PROCEDURE: TREATMENT REGIMEN

## 2023-03-30 NOTE — PROCEDURE: SUPERFICIAL RADIATION TREATMENT
Bill For Simulation?: Yes - (Simple Simulation: 90880) Bill For Simulation: Yes - (Simple Simulation: 01391)

## 2023-04-04 ENCOUNTER — APPOINTMENT (OUTPATIENT)
Dept: URBAN - METROPOLITAN AREA CLINIC 321 | Age: 83
Setting detail: DERMATOLOGY
End: 2023-04-04

## 2023-04-04 PROBLEM — C44.311 BASAL CELL CARCINOMA OF SKIN OF NOSE: Status: ACTIVE | Noted: 2023-04-04

## 2023-04-04 PROCEDURE — 77401 RADIATION TX DELIVERY SUPFC: CPT

## 2023-04-04 PROCEDURE — OTHER TREATMENT REGIMEN: OTHER

## 2023-04-04 PROCEDURE — OTHER FOLLOW UP FOR NEXT VISIT: OTHER

## 2023-04-04 PROCEDURE — G6001 ECHO GUIDANCE RADIOTHERAPY: HCPCS | Mod: XU

## 2023-04-04 PROCEDURE — 77280 THER RAD SIMULAJ FIELD SMPL: CPT

## 2023-04-04 PROCEDURE — OTHER SUPERFICIAL RADIATION TREATMENT: OTHER

## 2023-04-04 NOTE — PROCEDURE: SUPERFICIAL RADIATION TREATMENT
Bill For Simulation?: Yes - (Simple Simulation: 04383) Bill For Simulation: Yes - (Simple Simulation: 60695)

## 2023-04-04 NOTE — PROCEDURE: TREATMENT REGIMEN

## 2023-04-05 ENCOUNTER — APPOINTMENT (OUTPATIENT)
Dept: URBAN - METROPOLITAN AREA CLINIC 321 | Age: 83
Setting detail: DERMATOLOGY
End: 2023-04-05

## 2023-04-05 PROBLEM — C44.311 BASAL CELL CARCINOMA OF SKIN OF NOSE: Status: ACTIVE | Noted: 2023-04-05

## 2023-04-05 PROCEDURE — 77401 RADIATION TX DELIVERY SUPFC: CPT

## 2023-04-05 PROCEDURE — 77280 THER RAD SIMULAJ FIELD SMPL: CPT

## 2023-04-05 PROCEDURE — G6001 ECHO GUIDANCE RADIOTHERAPY: HCPCS | Mod: XU

## 2023-04-05 PROCEDURE — OTHER TREATMENT REGIMEN: OTHER

## 2023-04-05 PROCEDURE — OTHER SUPERFICIAL RADIATION TREATMENT: OTHER

## 2023-04-05 PROCEDURE — OTHER FOLLOW UP FOR NEXT VISIT: OTHER

## 2023-04-05 NOTE — PROCEDURE: TREATMENT REGIMEN

## 2023-04-05 NOTE — PROCEDURE: SUPERFICIAL RADIATION TREATMENT
ascorbic acid (VITAMIN C) 500 mg tablet Hold day of surgery  aspirin (ECOTRIN LOW STRENGTH) 81 mg EC tablet Stop taking 7 days prior to surgery  atenolol-chlorthalidone (TENORETIC) 50-25 mg per tablet Take day of surgery  docusate sodium (COLACE) 100 mg capsule Hold day of surgery  ferrous sulfate 324 (65 Fe) mg Hold day of surgery  folic acid (FOLVITE) 651 MCG tablet Hold day of surgery  metFORMIN (GLUCOPHAGE) 500 mg tablet Hold day of surgery  Multiple Vitamins-Minerals (multivitamin with minerals) tablet Hold day of surgery  pravastatin (PRAVACHOL) 40 mg tablet Take day of surgery  Dose Per Fractionation In Cgy (Optional): 277.84

## 2023-04-05 NOTE — PROCEDURE: SUPERFICIAL RADIATION TREATMENT
Bill For Simulation: Yes - (Simple Simulation: 66565) Bill For Simulation?: Yes - (Simple Simulation: 92926)

## 2023-04-06 ENCOUNTER — APPOINTMENT (OUTPATIENT)
Dept: URBAN - METROPOLITAN AREA CLINIC 321 | Age: 83
Setting detail: DERMATOLOGY
End: 2023-04-09

## 2023-04-06 PROBLEM — C44.311 BASAL CELL CARCINOMA OF SKIN OF NOSE: Status: ACTIVE | Noted: 2023-04-06

## 2023-04-06 PROCEDURE — 77280 THER RAD SIMULAJ FIELD SMPL: CPT

## 2023-04-06 PROCEDURE — G6001 ECHO GUIDANCE RADIOTHERAPY: HCPCS | Mod: XU

## 2023-04-06 PROCEDURE — OTHER TREATMENT REGIMEN: OTHER

## 2023-04-06 PROCEDURE — 77427 RADIATION TX MANAGEMENT X5: CPT

## 2023-04-06 PROCEDURE — OTHER SUPERFICIAL RADIATION TREATMENT: OTHER

## 2023-04-06 PROCEDURE — 77401 RADIATION TX DELIVERY SUPFC: CPT

## 2023-04-06 PROCEDURE — OTHER FOLLOW UP FOR NEXT VISIT: OTHER

## 2023-04-06 NOTE — PROCEDURE: TREATMENT REGIMEN
Detail Level: Zone
Plan: This patient has been treated today with image-guided superficial radiation therapy for non-melanoma skin cancer. Written informed consent has been previously obtained from this patient for this treatment. This consent is documented in the patient's chart. The patient gave verbal consent to continue treatment today. The patient was treated with a specific radiation dose and setup as prescribed by the provider listed on this visit note. A Radiation Therapist performed administration of radiation under the supervision of a provider. The treatment parameters and cumulative dose are indicated above. Prior to administering the radiation, the patient underwent a verification therapeutic radiology simulation-aided field setting defining relevant normal and abnormal target anatomy and acquiring images with a separate and distinct diagnostic high-frequency ultrasound to delineate tissues and determine whether to proceed with delivery of therapy, in addition to retrieve data necessary to develop an optimal radiation treatment process for the patient. The field placement simulation documents any change seen in overall tumor volume documented in the patient’s record, allows the clinician to indicate any needed changes in the treatment plan and/or prescription, provides diagnostic evaluation as the basis for performing the therapeutic procedure, and clearly identifies the information needed to decide to proceed with the therapeutic procedure. This process includes verification of the treatment port(s) and proper treatment positioning. All treatment ports were photographed within electronic medical records. The patient's lead blocking along with gross tumor volume and margin was confirmed. Considering superficial radiotherapy is clinical in setup, this requires the physician and radiation therapist to clarify the location interest being treated against initial images, ultrasound, pathology, and patient anatomy. Care was taken to ensure adams treated were geometrically accurate and properly positioned using therapeutic radiology simulation-aided field setting verification per fraction. This process is also utilized to determine if any prescription or setup changes are necessary. These steps are therefore medically necessary to ensure safe and effective administration of radiation. Ongoing therapeutic radiology simulation-aided field setting verification is ordered throughout the course of therapy.\\n\\nA high-frequency ultrasound image was acquired today for a two-dimensional evaluation of the tumor volume, depth, width, breadth, review of prior response to treatment, provide geometric accuracy of field placement, and determine whether to proceed with therapeutic delivery.\\n\\nHigh frequency ultrasound depth is  1.62mm, which is  0.07mm in difference from previous imaging.\\n\\nThe field placement and ultrasound imaging, per fraction, is separate and distinct from the initial simulation and is an important task in providing safe administration of superficial radiation therapy. Physician evaluation of the ultrasound information will be ongoing throughout the course of treatment and is deemed medically necessary to ensure the efficacy of treatment, whether to proceed with therapeutic delivery, and determine any necessary changes. Today's images were evaluated for determination of continuation of treatment with the current plan or with necessary changes as appropriate. According to the review of verification therapeutic radiology simulation-aided field setting and imaging, no change is required. Additionally, the use of ultrasound visualization and targeted assessment allows the patient to be able to see his or her cancer(s) progress, encouraging the patient to complete and maintain compliance through the full course of prescribed radiotherapy. Per Dr. Faith, continued ultrasound guidance and therapeutic radiology simulation-aided field setting verification per fraction is required for field placement, measurement of tumor depth, tissues evaluation, progress, acute effect monitoring, and determination for therapeutic treatment delivery is appropriate.\\nPlan: The patient is undergoing superficial radiation therapy for skin cancer and presents for weekly evaluation and management. Per protocol and as documented on the flow sheet, the patient was questioned as to subjective redness, pruritus, pain, drainage, fatigue, or any other symptoms. Objectively, the radiation area was evaluated with regards to erythema, atrophy, scale, crusting, erosion, ulceration, edema, purpura, tenderness, warmth, drainage, and any other findings. The plan was extensively reviewed including dose and dosing schedule. The simulation and clinical setup were also reviewed as were external and any internal shields and based on this review the appropriateness and sufficiency of treatment was determined.\\n\\nA high-frequency ultrasound image was acquired today for a two-dimensional evaluation of the tumor volume, depth, width, breadth, review of prior response to treatment, provide geometric accuracy of field placement, and determine whether to proceed with therapeutic delivery.\\n\\nHigh frequency ultrasound depth is 1.62mm, which is  0.07mm in difference from previous imaging.\\n\\nPer Dr. Faith, continued ultrasound guidance and therapeutic radiology simulation-aided field setting verification per fraction is required for field placement, measurement of tumor depth, tissues evaluation, progress, acute effect monitoring, and determination for therapeutic treatment delivery is appropriate.\\n\\nSubjective:\\nRedness \\n\\nObjective:\\nErythema, Crusting and Erosion \\n\\nAssessment:\\nAppropriate reaction\\n\\nPlan:\\nContinue current treatment regimen \\n\\nComments:\\nRTOG 2

## 2023-04-06 NOTE — PROCEDURE: SUPERFICIAL RADIATION TREATMENT
Bill For Simulation: Yes - (Simple Simulation: 59365) Bill For Simulation?: Yes - (Simple Simulation: 65950)

## 2023-04-10 ENCOUNTER — APPOINTMENT (OUTPATIENT)
Dept: URBAN - METROPOLITAN AREA CLINIC 321 | Age: 83
Setting detail: DERMATOLOGY
End: 2023-04-18

## 2023-04-10 PROBLEM — C44.311 BASAL CELL CARCINOMA OF SKIN OF NOSE: Status: ACTIVE | Noted: 2023-04-10

## 2023-04-10 PROCEDURE — OTHER TREATMENT REGIMEN: OTHER

## 2023-04-10 PROCEDURE — 77401 RADIATION TX DELIVERY SUPFC: CPT

## 2023-04-10 PROCEDURE — OTHER FOLLOW UP FOR NEXT VISIT: OTHER

## 2023-04-10 PROCEDURE — 77280 THER RAD SIMULAJ FIELD SMPL: CPT

## 2023-04-10 PROCEDURE — OTHER SUPERFICIAL RADIATION TREATMENT: OTHER

## 2023-04-10 PROCEDURE — G6001 ECHO GUIDANCE RADIOTHERAPY: HCPCS | Mod: XU

## 2023-04-10 NOTE — PROCEDURE: SUPERFICIAL RADIATION TREATMENT
Bill For Simulation?: Yes - (Simple Simulation: 55835) Bill For Simulation: Yes - (Simple Simulation: 38517)

## 2023-04-10 NOTE — PROCEDURE: TREATMENT REGIMEN

## 2023-04-12 ENCOUNTER — APPOINTMENT (OUTPATIENT)
Dept: URBAN - METROPOLITAN AREA CLINIC 321 | Age: 83
Setting detail: DERMATOLOGY
End: 2023-04-18

## 2023-04-12 PROBLEM — C44.311 BASAL CELL CARCINOMA OF SKIN OF NOSE: Status: ACTIVE | Noted: 2023-04-12

## 2023-04-12 PROCEDURE — OTHER TREATMENT REGIMEN: OTHER

## 2023-04-12 PROCEDURE — 77401 RADIATION TX DELIVERY SUPFC: CPT

## 2023-04-12 PROCEDURE — 77280 THER RAD SIMULAJ FIELD SMPL: CPT

## 2023-04-12 PROCEDURE — G6001 ECHO GUIDANCE RADIOTHERAPY: HCPCS | Mod: XU

## 2023-04-12 PROCEDURE — OTHER FOLLOW UP FOR NEXT VISIT: OTHER

## 2023-04-12 PROCEDURE — OTHER SUPERFICIAL RADIATION TREATMENT: OTHER

## 2023-04-12 NOTE — PROCEDURE: TREATMENT REGIMEN

## 2023-04-12 NOTE — PROCEDURE: SUPERFICIAL RADIATION TREATMENT
Bill For Simulation: Yes - (Simple Simulation: 28840) Bill For Simulation?: Yes - (Simple Simulation: 71598)

## 2023-04-17 ENCOUNTER — APPOINTMENT (OUTPATIENT)
Dept: URBAN - METROPOLITAN AREA CLINIC 321 | Age: 83
Setting detail: DERMATOLOGY
End: 2023-04-18

## 2023-04-17 PROBLEM — C44.311 BASAL CELL CARCINOMA OF SKIN OF NOSE: Status: ACTIVE | Noted: 2023-04-17

## 2023-04-17 PROCEDURE — G6001 ECHO GUIDANCE RADIOTHERAPY: HCPCS | Mod: XU

## 2023-04-17 PROCEDURE — OTHER TREATMENT REGIMEN: OTHER

## 2023-04-17 PROCEDURE — 77401 RADIATION TX DELIVERY SUPFC: CPT

## 2023-04-17 PROCEDURE — OTHER FOLLOW UP FOR NEXT VISIT: OTHER

## 2023-04-17 PROCEDURE — 77280 THER RAD SIMULAJ FIELD SMPL: CPT

## 2023-04-17 PROCEDURE — OTHER SUPERFICIAL RADIATION TREATMENT: OTHER

## 2023-04-17 NOTE — PROCEDURE: TREATMENT REGIMEN

## 2023-04-17 NOTE — PROCEDURE: SUPERFICIAL RADIATION TREATMENT
Bill For Simulation?: Yes - (Simple Simulation: 67957) Bill For Simulation: Yes - (Simple Simulation: 31217)

## 2023-04-18 ENCOUNTER — APPOINTMENT (OUTPATIENT)
Dept: URBAN - METROPOLITAN AREA CLINIC 321 | Age: 83
Setting detail: DERMATOLOGY
End: 2023-04-18

## 2023-04-18 PROBLEM — C44.311 BASAL CELL CARCINOMA OF SKIN OF NOSE: Status: ACTIVE | Noted: 2023-04-18

## 2023-04-18 PROCEDURE — G6001 ECHO GUIDANCE RADIOTHERAPY: HCPCS | Mod: XU

## 2023-04-18 PROCEDURE — OTHER TREATMENT REGIMEN: OTHER

## 2023-04-18 PROCEDURE — OTHER FOLLOW UP FOR NEXT VISIT: OTHER

## 2023-04-18 PROCEDURE — 77401 RADIATION TX DELIVERY SUPFC: CPT

## 2023-04-18 PROCEDURE — OTHER SUPERFICIAL RADIATION TREATMENT: OTHER

## 2023-04-18 PROCEDURE — 77280 THER RAD SIMULAJ FIELD SMPL: CPT

## 2023-04-18 NOTE — PROCEDURE: TREATMENT REGIMEN

## 2023-04-18 NOTE — PROCEDURE: SUPERFICIAL RADIATION TREATMENT
Bill For Simulation: Yes - (Simple Simulation: 53598) Bill For Simulation?: Yes - (Simple Simulation: 72355)

## 2023-04-19 ENCOUNTER — APPOINTMENT (OUTPATIENT)
Dept: URBAN - METROPOLITAN AREA CLINIC 321 | Age: 83
Setting detail: DERMATOLOGY
End: 2023-04-23

## 2023-04-19 PROBLEM — C44.311 BASAL CELL CARCINOMA OF SKIN OF NOSE: Status: ACTIVE | Noted: 2023-04-19

## 2023-04-19 PROCEDURE — 77427 RADIATION TX MANAGEMENT X5: CPT

## 2023-04-19 PROCEDURE — OTHER TREATMENT REGIMEN: OTHER

## 2023-04-19 PROCEDURE — G6001 ECHO GUIDANCE RADIOTHERAPY: HCPCS | Mod: XU

## 2023-04-19 PROCEDURE — 77280 THER RAD SIMULAJ FIELD SMPL: CPT

## 2023-04-19 PROCEDURE — OTHER SUPERFICIAL RADIATION TREATMENT: OTHER

## 2023-04-19 PROCEDURE — OTHER FOLLOW UP FOR NEXT VISIT: OTHER

## 2023-04-19 PROCEDURE — 77401 RADIATION TX DELIVERY SUPFC: CPT

## 2023-04-19 NOTE — PROCEDURE: SUPERFICIAL RADIATION TREATMENT
Bill For Simulation: Yes - (Simple Simulation: 69454) Bill For Simulation?: Yes - (Simple Simulation: 34490)

## 2023-04-19 NOTE — PROCEDURE: TREATMENT REGIMEN
Plan: This patient has been treated today with image-guided superficial radiation therapy for non-melanoma skin cancer. Written informed consent has been previously obtained from this patient for this treatment. This consent is documented in the patient's chart. The patient gave verbal consent to continue treatment today. The patient was treated with a specific radiation dose and setup as prescribed by the provider listed on this visit note. A Radiation Therapist performed administration of radiation under the supervision of a provider. The treatment parameters and cumulative dose are indicated above. Prior to administering the radiation, the patient underwent a verification therapeutic radiology simulation-aided field setting defining relevant normal and abnormal target anatomy and acquiring images with a separate and distinct diagnostic high-frequency ultrasound to delineate tissues and determine whether to proceed with delivery of therapy, in addition to retrieve data necessary to develop an optimal radiation treatment process for the patient. The field placement simulation documents any change seen in overall tumor volume documented in the patient’s record, allows the clinician to indicate any needed changes in the treatment plan and/or prescription, provides diagnostic evaluation as the basis for performing the therapeutic procedure, and clearly identifies the information needed to decide to proceed with the therapeutic procedure. This process includes verification of the treatment port(s) and proper treatment positioning. All treatment ports were photographed within electronic medical records. The patient's lead blocking along with gross tumor volume and margin was confirmed. Considering superficial radiotherapy is clinical in setup, this requires the physician and radiation therapist to clarify the location interest being treated against initial images, ultrasound, pathology, and patient anatomy. Care was taken to ensure adams treated were geometrically accurate and properly positioned using therapeutic radiology simulation-aided field setting verification per fraction. This process is also utilized to determine if any prescription or setup changes are necessary. These steps are therefore medically necessary to ensure safe and effective administration of radiation. Ongoing therapeutic radiology simulation-aided field setting verification is ordered throughout the course of therapy.\\n\\nA high-frequency ultrasound image was acquired today for a two-dimensional evaluation of the tumor volume, depth, width, breadth, review of prior response to treatment, provide geometric accuracy of field placement, and determine whether to proceed with therapeutic delivery.\\n\\nHigh frequency ultrasound depth is  1.72mm, which is  0.02mm in difference from previous imaging.\\n\\nThe field placement and ultrasound imaging, per fraction, is separate and distinct from the initial simulation and is an important task in providing safe administration of superficial radiation therapy. Physician evaluation of the ultrasound information will be ongoing throughout the course of treatment and is deemed medically necessary to ensure the efficacy of treatment, whether to proceed with therapeutic delivery, and determine any necessary changes. Today's images were evaluated for determination of continuation of treatment with the current plan or with necessary changes as appropriate. According to the review of verification therapeutic radiology simulation-aided field setting and imaging, no change is required. Additionally, the use of ultrasound visualization and targeted assessment allows the patient to be able to see his or her cancer(s) progress, encouraging the patient to complete and maintain compliance through the full course of prescribed radiotherapy. Per Dr. Faith, continued ultrasound guidance and therapeutic radiology simulation-aided field setting verification per fraction is required for field placement, measurement of tumor depth, tissues evaluation, progress, acute effect monitoring, and determination for therapeutic treatment delivery is appropriate.\\n\\nPlan: The patient is undergoing superficial radiation therapy for skin cancer and presents for weekly evaluation and management. Per protocol and as documented on the flow sheet, the patient was questioned as to subjective redness, pruritus, pain, drainage, fatigue, or any other symptoms. Objectively, the radiation area was evaluated with regards to erythema, atrophy, scale, crusting, erosion, ulceration, edema, purpura, tenderness, warmth, drainage, and any other findings. The plan was extensively reviewed including dose and dosing schedule. The simulation and clinical setup were also reviewed as were external and any internal shields and based on this review the appropriateness and sufficiency of treatment was determined.\\n\\nA high-frequency ultrasound image was acquired today for a two-dimensional evaluation of the tumor volume, depth, width, breadth, review of prior response to treatment, provide geometric accuracy of field placement, and determine whether to proceed with therapeutic delivery.\\n\\nHigh frequency ultrasound depth is   1.72mm, which is  0.02mm in difference from previous imaging.\\n\\nPer Dr. Faith, continued ultrasound guidance and therapeutic radiology simulation-aided field setting verification per fraction is required for field placement, measurement of tumor depth, tissues evaluation, progress, acute effect monitoring, and determination for therapeutic treatment delivery is appropriate.\\n\\nSubjective:\\nRedness \\n\\nObjective:\\nErythema, Crusting\\n\\nAssessment:\\nAppropriate reaction\\n\\nPlan:\\nContinue current treatment regimen \\n\\nComments:\\nRTOG 2
Detail Level: Zone

## 2023-06-14 ENCOUNTER — APPOINTMENT (OUTPATIENT)
Dept: URBAN - METROPOLITAN AREA CLINIC 244 | Age: 83
Setting detail: DERMATOLOGY
End: 2023-06-16

## 2023-06-14 PROBLEM — C44.311 BASAL CELL CARCINOMA OF SKIN OF NOSE: Status: ACTIVE | Noted: 2023-06-14

## 2023-06-14 PROCEDURE — G6001 ECHO GUIDANCE RADIOTHERAPY: HCPCS

## 2023-06-14 PROCEDURE — 99213 OFFICE O/P EST LOW 20 MIN: CPT

## 2023-06-14 PROCEDURE — OTHER IMAGE GUIDED - SUPERFICIAL RADIOTHERAPY: EVALUATION VISIT: OTHER

## 2023-06-14 PROCEDURE — OTHER IMAGE GUIDED - SUPERFICIAL RADIOTHERAPY: OTHER

## 2023-06-14 NOTE — PROCEDURE: IMAGE GUIDED - SUPERFICIAL RADIOTHERAPY
Applicator Size In Cm: 2.0 cm
Add A Second Prescription?: No
Energy (Kv): 100
Lesion Margin Size In Cm: 0.5
Treatments Per Week: 3
Physics Documentation: (Physics Check Verbiage)
Lesion Dimensions-X Axis In Cm: 1
Daily Dose (Cgy): 277.84
Additional Fraction(S) Needed:: 0
Body Location Override (Optional): Top Nasal Bulb
Pathology Override (Pathology Will Render As Diagnosis Name If Left Blank): Primary Infiltrating Basal Cell carcinoma
Treatment Time (Min): 0.46
Total Dose For Prescription 1 (Cgy): 5556.80
Lesion Dimensions-Y Axis In Cm: 0.7
Bill For Physics Consultation: No - Render Text Only
Number Of Fractions For Prescription 1: 20
Detail Level: Detailed
Time, Dose, Fractionation Factor (Tdf) For Prescription 1: 99

## 2023-06-14 NOTE — PROCEDURE: IMAGE GUIDED - SUPERFICIAL RADIOTHERAPY: EVALUATION VISIT
Was Ultrasound Performed Today?: Yes
Ultrasound Not Used Text: Ultrasound was not performed today due to
Evaluation Plan: The patient is undergoing superficial radiation therapy for skin cancer and presents for weekly evaluation and management. Per protocol and as documented on the flow sheet, the patient was questioned as to subjective redness, pruritus, pain, drainage, fatigue, or any other symptoms. Objectively, the radiation area was evaluated with regards to erythema, atrophy, scale, crusting, erosion, ulceration, edema, purpura, tenderness, warmth, drainage, and any other findings. The plan was extensively reviewed including dose and dosing schedule. The simulation and clinical setup were also reviewed as were external and any internal shields and based on this review the appropriateness and sufficiency of treatment was determined.
Assessment: Appropriate reaction
Bill For Evaluation (80129)?: No
Is This Visit For Evaluation During Treatment Or Follow Up Post Treatment?: follow up
Radiation Therapy Oncology Group (Rtog) Score: 0
Ultrasound Used Text: Per the request of Dr. Faith, patient was seen today for a 8 week follow up for Superficial Radiation Therapy. Physician evaluation of the ultrasound tumor depth, width, and breadth was ongoing through course of treatment and is deemed medically necessary ensuring efficacy of treatment. All appropriate blocking and treatment parameters verified by radiation therapist according to initial simulation. A high frequency ultrasound image was acquired today for two-dimensional evaluation of treatment volume and response to treatment, in addition, geometric accuracy of field placement. \\n\\nToday’s image was evaluated, lesion not detected on US.\\n\\nObjectively, the treated radiation area was evaluated with regards to erythema, atrophy, scale, crusting, erosion, ulceration, edema, purpura, tenderness, warmth, drainage, and any other finding. Patient to follow up for routine visits.

## 2023-08-28 NOTE — PROCEDURE: SUPERFICIAL RADIATION TREATMENT
Was The Patient On Physician Recommended Anticoagulation Therapy?: Please Select the Appropriate Response Additional Prescription Justification Text: If there is any interruption in treatment exceeding 5 days please see Decay and Dose Adjustment Calculation and complete treatment under Prescription 2, 3 or 4 as appropriate.

## 2023-10-16 ENCOUNTER — APPOINTMENT (OUTPATIENT)
Dept: URBAN - METROPOLITAN AREA CLINIC 244 | Age: 83
Setting detail: DERMATOLOGY
End: 2023-10-16

## 2023-10-16 PROBLEM — C44.619 BASAL CELL CARCINOMA OF SKIN OF LEFT UPPER LIMB, INCLUDING SHOULDER: Status: ACTIVE | Noted: 2023-10-16

## 2023-10-16 PROCEDURE — 99212 OFFICE O/P EST SF 10 MIN: CPT

## 2023-10-16 PROCEDURE — OTHER ADDITIONAL NOTES: OTHER

## 2023-10-16 PROCEDURE — OTHER COUNSELING: OTHER

## 2023-10-16 NOTE — PROCEDURE: ADDITIONAL NOTES
Additional Notes: Biopsy done by Vaishnavi Jose, pt came in for second opinion. Pt will schedule procedure with Dr. Jose
Detail Level: Simple
Render Risk Assessment In Note?: no

## 2023-10-16 NOTE — HPI: SKIN LESION
How Severe Is Your Skin Lesion?: mild
Has Your Skin Lesion Been Treated?: not been treated
Is This A New Presentation, Or A Follow-Up?: Skin Lesions
Additional History: Biopsied 9/28/23 by Dr. Vaishnavi Jose c/w [facility], was recc complete removal, pt here for second opinion

## 2025-03-30 NOTE — PROCEDURE: SUPERFICIAL RADIATION TREATMENT
Level of Care: Telemetry [5]   Diagnosis: Hypoxia [166441]   Bed Request Comments: NOT cdu appropriate   Treatment Time In Min (Optional): .46

## 2025-04-23 NOTE — PROCEDURE: SUPERFICIAL RADIATION TREATMENT
Prepped: right abdomen. Prepped with: ChloraPrep. The patient was draped. Intro Statement (Will Not Render If Left Blank): The patient is undergoing superficial radiation therapy for skin cancer and presents for weekly evaluation and management.  Per protocol and as documented on the flow sheet, the patient was questioned as to subjective redness, pruritus, pain, drainage, fatigue, or any other symptoms.  Objectively, the radiation area was evaluated with regards to erythema, atrophy, scale, crusting, erosion, ulceration, edema, purpura, tenderness, warmth, drainage, and any other findings.  The plan was extensively reviewed including the dose, and dosing schedule.  The simulation and clinical setup was also reviewed as was the external and any internal shields and based on this review the appropriateness and sufficiency of treatment was determined.